# Patient Record
Sex: FEMALE | Race: BLACK OR AFRICAN AMERICAN | URBAN - NONMETROPOLITAN AREA
[De-identification: names, ages, dates, MRNs, and addresses within clinical notes are randomized per-mention and may not be internally consistent; named-entity substitution may affect disease eponyms.]

---

## 2021-09-02 ENCOUNTER — APPOINTMENT (RX ONLY)
Dept: URBAN - NONMETROPOLITAN AREA CLINIC 1 | Facility: CLINIC | Age: 46
Setting detail: DERMATOLOGY
End: 2021-09-02

## 2021-09-02 DIAGNOSIS — L21.8 OTHER SEBORRHEIC DERMATITIS: ICD-10-CM | Status: INADEQUATELY CONTROLLED

## 2021-09-02 PROCEDURE — 99204 OFFICE O/P NEW MOD 45 MIN: CPT

## 2021-09-02 PROCEDURE — ? PRESCRIPTION MEDICATION MANAGEMENT

## 2021-09-02 PROCEDURE — ? FULL BODY SKIN EXAM - DECLINED

## 2021-09-02 PROCEDURE — ? TREATMENT REGIMEN

## 2021-09-02 PROCEDURE — ? MEDICATION COUNSELING

## 2021-09-02 PROCEDURE — ? PRESCRIPTION

## 2021-09-02 PROCEDURE — ? ADDITIONAL NOTES

## 2021-09-02 PROCEDURE — ? COUNSELING

## 2021-09-02 RX ORDER — EMOLLIENT COMBINATION NO.107
CREAM (GRAM) TOPICAL
Qty: 50 | Refills: 3 | Status: ERX | COMMUNITY
Start: 2021-09-02

## 2021-09-02 RX ORDER — HYDROCORTISONE 25 MG/G
CREAM TOPICAL
Qty: 30 | Refills: 3 | Status: ERX | COMMUNITY
Start: 2021-09-02

## 2021-09-02 RX ORDER — CLOTRIMAZOLE 1 %
CREAM (GRAM) TOPICAL
Qty: 30 | Refills: 3 | Status: ERX | COMMUNITY
Start: 2021-09-02

## 2021-09-02 RX ADMIN — Medication: at 00:00

## 2021-09-02 RX ADMIN — HYDROCORTISONE: 25 CREAM TOPICAL at 00:00

## 2021-09-02 ASSESSMENT — LOCATION DETAILED DESCRIPTION DERM
LOCATION DETAILED: LEFT SUPERIOR MEDIAL UPPER BACK
LOCATION DETAILED: LEFT INFERIOR MEDIAL FOREHEAD
LOCATION DETAILED: GLABELLA
LOCATION DETAILED: RIGHT LATERAL BUCCAL CHEEK
LOCATION DETAILED: LEFT CENTRAL BUCCAL CHEEK

## 2021-09-02 ASSESSMENT — LOCATION SIMPLE DESCRIPTION DERM
LOCATION SIMPLE: RIGHT CHEEK
LOCATION SIMPLE: LEFT UPPER BACK
LOCATION SIMPLE: LEFT CHEEK
LOCATION SIMPLE: LEFT FOREHEAD
LOCATION SIMPLE: GLABELLA

## 2021-09-02 ASSESSMENT — LOCATION ZONE DERM
LOCATION ZONE: FACE
LOCATION ZONE: TRUNK

## 2021-09-02 NOTE — PROCEDURE: MEDICATION COUNSELING
DATE OF SERVICE:  02/25/2020    PROCEDURE:  Esophagogastroduodenoscopy.    PREOPERATIVE DIAGNOSES:  1.  Higginbotham's esophagus.  2.  Gastroesophageal reflux disease.    POSTOPERATIVE DIAGNOSES:  1.  Higginbotham's esophagus.  2.  Gastroesophageal reflux disease.  3.  Hiatal hernia.  4.  Gastric polyps.    ANESTHESIA:  Propofol anesthesia per anesthesiologist.    DESCRIPTION OF PROCEDURE:  After informed consent and appropriate sedation,   the patient was placed in left lateral position and the gastroscope was   advanced through the oropharynx into the esophagus through to the second   portion of the duodenum.  The ampulla was at an abnormal angle, but the   duodenum was visualized after intubating the pylorus.  The duodenum was   unremarkable.  The scope was withdrawn back into the stomach.  Gastric antrum   was unremarkable other than the anatomical distortion.  Mucosa appeared   completely normal and there were no obvious submucosal lesions.  There were   numerous gastric polyps that were up to 1 cm in the stomach.  Multiple were   biopsied with cold forceps.  On retroflexion, the gastric cardia and fundus   were unremarkable other than further polyps as well as a 3-cm hiatal hernia.    The stomach was decompressed.  The scope was withdrawn back into the   esophagus.  GE junction showed signs of Higginbotham's esophagus, C0M2 for [place]   classification.  These areas were biopsied with cold forceps in one jar and   sent for pathology.  The scope was withdrawn as the bowel was decompressed.    There were no immediate postop complications.    RECOMMENDATIONS:  1.  Follow up on biopsy results.  2.  Repeat EGD in 3 years; however, we will base of it ultimately upon the   biopsy results.  3.  Follow up in the office annually.  4.  Continue with PPI.  5.  Could consider upper GI series to evaluate further and rule out presence   of a paraesophageal hiatal hernia because of the slight abnormality of the   anatomy.  The patient  certainly does have a sliding hiatal hernia in addition.  6.  Call with any questions or concerns.  Anticipate the patient will be   discharged directly home from the hospital today as an outpatient and will be   able to advance diet as tolerated.       ____________________________________     DO LUCY Penn / PRACHI    DD:  02/25/2020 08:30:08  DT:  02/25/2020 08:42:59    D#:  [id number]  Job#:  [id number]   Prednisone Counseling:  I discussed with the patient the risks of prolonged use of prednisone including but not limited to weight gain, insomnia, osteoporosis, mood changes, diabetes, susceptibility to infection, glaucoma and high blood pressure.  In cases where prednisone use is prolonged, patients should be monitored with blood pressure checks, serum glucose levels and an eye exam.  Additionally, the patient may need to be placed on GI prophylaxis, PCP prophylaxis, and calcium and vitamin D supplementation and/or a bisphosphonate.  The patient verbalized understanding of the proper use and the possible adverse effects of prednisone.  All of the patient's questions and concerns were addressed.

## 2021-09-02 NOTE — PROCEDURE: PRESCRIPTION MEDICATION MANAGEMENT
Initiate Treatment: Hydrocortisone use as directed.\\nLotrimin AF use as directed.\\nNutraseb use as directed.
Detail Level: Zone
Render In Strict Bullet Format?: No

## 2021-09-02 NOTE — HPI: ITCHING
On A Scale Of 0 To 10 How Would You Rate Your Itching?: 6
How Did Your Itching Occur?: gradual in onset  (over a period of years)
How Severe Is Your Itching?: moderate

## 2021-09-02 NOTE — PROCEDURE: MEDICATION COUNSELING

## 2023-02-06 NOTE — PROCEDURE: MEDICATION COUNSELING
Artificial tears up to four times a day.     Hot moist compress 2 times a day.  Fish oil 2 capsules (around 1,000 mg per pill) by mouth everyday.  Any brand is ok.     lotemax 2 times a day both sides.  restasis one drop twice a day in both eyes.    Tobramycin one drop four times a day in both eyes for one week.    Follow up in 4 days.     Bexarotene Pregnancy And Lactation Text: This medication is Pregnancy Category X and should not be given to women who are pregnant or may become pregnant. This medication should not be used if you are breast feeding.

## 2023-12-13 NOTE — PROCEDURE: MEDICATION COUNSELING
Functionality Assessment/Goals Worksheet     On a scale of 0 (Does not Interfere) to 10 (Completely Interferes)     1. Which number describes how during the past week pain has interfered with           the following:  A. General Activity:  5  B. Mood: 1  C. Walking Ability:  3  D. Normal Work (Includes both work outside the home and housework):  5  E. Relations with Other People:   1  F. Sleep:   7  G. Enjoyment of Life:   3    2. Patient Prefers to Take their Pain Medications:     [x]  On a regular basis   [x]  Only when necessary    []  Does not take pain medications    3. What are the Patient's Goals/Expectations for Visiting Pain Management?      [x]  Learn about my pain    []  Receive Medication   [x]  Physical Therapy     []  Treat Depression   []  Receive Injections    []  Treat Sleep   []  Deal with Anxiety and Stress   []  Treat Opoid Dependence/Addiction   []  Other: IMPRESSION:     Prior signal change in the right para midline dorsal cord at C6-C7 level has essentially resolved, likely resolved sequelae of cord injury [either vascular insult or posttraumatic]. No new or growing area of cord signal change. Unchanged subtle central cord signal abnormality of the C5 level. No   new or growing area abnormal cord signal.     Junctional degenerative changes at C4-C5 with mild-to-moderate focal thecal sac and moderate to severe right, moderate left, neural foraminal narrowing. Additional notable neural foraminal narrowing bilateral C6-C7, moderate to severe. MRI CERVICAL SPINE:    No acute fracture or dislocation. No spondylolisthesis. Straightening of  normal cervical lordosis. Normal bone marrow signal intensity. Tiny focus of elevated T2 signal of the  cord at C5, series 7, image 8; spinal cord appears otherwise normal.    Atlantodental interval is normal. Atlantooccipital articulation is intact. Vertebral body heights are preserved. Status post C5-7 ACDF. No canal stenosis. Mild bilateral C3-4, severe bilateral C4-5, mild bilateral  C5-6, and severe bilateral C6-7 neuroforaminal narrowing secondary to  uncovertebral and facet joint hypertrophy. MRI LUMBAR SPINE:    5 lumbar appearing non-rib bearing vertebral bodies. No acute fracture or dislocation. Appropriate alignment. Multilevel chronic  minimal vertebral body height loss. Severe T11-L1 and moderate L2-4 disc  space narrowing with diffuse disc desiccation. Modic type I endplate changes at C00-D4. Conus medullaris and cauda equina  appear normal.    Moderate left and mild right L2-3, severe bilateral L3-4, moderate bilateral  L4-5, and mild bilateral L5-S1 neuroforaminal narrowing. Severe canal stenosis (4 mm) at L3-4 and mild canal stenosis (9 mm) at T12-L1  and L2-3 secondary to a combination of disc bulges, ligamentum flavum  thickening, dorsal epidural fat, and facet arthropathy.  Additional Wartpeel Counseling:  I discussed with the patient the risks of Wartpeel including but not limited to erythema, scaling, itching, weeping, crusting, and pain.